# Patient Record
Sex: MALE | Race: WHITE | NOT HISPANIC OR LATINO | URBAN - METROPOLITAN AREA
[De-identification: names, ages, dates, MRNs, and addresses within clinical notes are randomized per-mention and may not be internally consistent; named-entity substitution may affect disease eponyms.]

---

## 2018-05-01 ENCOUNTER — EMERGENCY (EMERGENCY)
Facility: HOSPITAL | Age: 33
LOS: 0 days | Discharge: HOME | End: 2018-05-01
Attending: EMERGENCY MEDICINE | Admitting: EMERGENCY MEDICINE
Payer: OTHER MISCELLANEOUS

## 2018-05-01 VITALS
RESPIRATION RATE: 18 BRPM | OXYGEN SATURATION: 100 % | SYSTOLIC BLOOD PRESSURE: 133 MMHG | TEMPERATURE: 98 F | DIASTOLIC BLOOD PRESSURE: 89 MMHG | HEART RATE: 66 BPM

## 2018-05-01 VITALS — HEIGHT: 76 IN | WEIGHT: 214.95 LBS

## 2018-05-01 DIAGNOSIS — S02.2XXA FRACTURE OF NASAL BONES, INITIAL ENCOUNTER FOR CLOSED FRACTURE: ICD-10-CM
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
## 2018-05-01 PROCEDURE — 99282 EMERGENCY DEPT VISIT SF MDM: CPT
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
## 2020-01-28 NOTE — ED PROVIDER NOTE - CARDIAC, MLM
1/28/2020      Drew Pena  [address] [place] [address]  1968         Return to work          This is to certify that Drew Pena has been under the care of Sherita Willis MD from 01/23/2020 and can return to regular work on 02/03/2020.     Restrictions:  None    Remarks:  None    Thank you,      _______________________________________________________   Sherita Willis MD          [address] [place] [address]  Dept: [phone]   Normal rate, regular rhythm.  Heart sounds S1, S2.  No murmurs, rubs or gallops.